# Patient Record
Sex: MALE | Race: WHITE | Employment: UNEMPLOYED | URBAN - METROPOLITAN AREA
[De-identification: names, ages, dates, MRNs, and addresses within clinical notes are randomized per-mention and may not be internally consistent; named-entity substitution may affect disease eponyms.]

---

## 2021-06-29 ENCOUNTER — TELEPHONE (OUTPATIENT)
Dept: ORTHOPEDIC SURGERY | Age: 26
End: 2021-06-29

## 2021-06-29 NOTE — TELEPHONE ENCOUNTER
Appointment Request     Patient requesting earlier appointment: Yes  Appointment offered to patient: N/A  Patient Contact Number: [phone]      Patient has been referred to us from [facility]. Patient states he has a fracture of 4th metacarpal bone of right hand. Patient says its the second break last break was in January of last year. Patient has been splinted and wanted to know if he needs to be casted.

## 2021-06-30 NOTE — TELEPHONE ENCOUNTER
Reviewed with PATRICIA. It appears to be an old healed fx. Can work patient in next week or see Zaid Kapoor. Tried to call patient to schedule.  No answer, voice mail not set up

## 2024-02-12 ENCOUNTER — OFFICE VISIT (OUTPATIENT)
Dept: FAMILY MEDICINE CLINIC | Age: 29
End: 2024-02-12
Payer: COMMERCIAL

## 2024-02-12 VITALS
OXYGEN SATURATION: 98 % | HEART RATE: 84 BPM | SYSTOLIC BLOOD PRESSURE: 132 MMHG | BODY MASS INDEX: 27.9 KG/M2 | HEIGHT: 72 IN | WEIGHT: 206 LBS | RESPIRATION RATE: 16 BRPM | DIASTOLIC BLOOD PRESSURE: 85 MMHG | TEMPERATURE: 97.2 F

## 2024-02-12 DIAGNOSIS — B18.2 CHRONIC HEPATITIS C WITHOUT HEPATIC COMA (HCC): ICD-10-CM

## 2024-02-12 DIAGNOSIS — Z00.00 ANNUAL PHYSICAL EXAM: Primary | ICD-10-CM

## 2024-02-12 DIAGNOSIS — S69.91XA INJURY OF RIGHT HAND, INITIAL ENCOUNTER: ICD-10-CM

## 2024-02-12 DIAGNOSIS — Z76.89 ENCOUNTER TO ESTABLISH CARE: ICD-10-CM

## 2024-02-12 DIAGNOSIS — F90.2 ATTENTION DEFICIT HYPERACTIVITY DISORDER (ADHD), COMBINED TYPE: ICD-10-CM

## 2024-02-12 PROCEDURE — 99385 PREV VISIT NEW AGE 18-39: CPT | Performed by: STUDENT IN AN ORGANIZED HEALTH CARE EDUCATION/TRAINING PROGRAM

## 2024-02-12 RX ORDER — DEXTROAMPHETAMINE SACCHARATE, AMPHETAMINE ASPARTATE MONOHYDRATE, DEXTROAMPHETAMINE SULFATE AND AMPHETAMINE SULFATE 7.5; 7.5; 7.5; 7.5 MG/1; MG/1; MG/1; MG/1
30 CAPSULE, EXTENDED RELEASE ORAL DAILY
Qty: 30 CAPSULE | Refills: 0 | Status: SHIPPED | OUTPATIENT
Start: 2024-02-12 | End: 2024-03-13

## 2024-02-12 RX ORDER — ALBUTEROL SULFATE 90 UG/1
2 AEROSOL, METERED RESPIRATORY (INHALATION) EVERY 6 HOURS PRN
Qty: 18 G | Refills: 3 | Status: SHIPPED | OUTPATIENT
Start: 2024-02-12

## 2024-02-12 NOTE — PROGRESS NOTES
and Affect: Mood normal.         Behavior: Behavior normal.         Thought Content: Thought content normal.         Judgment: Judgment normal.         Body mass index is 27.94 kg/m².    Labs:  No results found for this or any previous visit (from the past 672 hour(s)).    Imaging:  No orders to display         ASSESSMENT & PLAN:    German Jean is a 28 y.o. year old male here for New Patient (Hep C treatment, ADHD medication, inhaler)  .The following is a summary of the plan made at this visit:    1. Annual physical exam  2. Encounter to establish care  3. Chronic hepatitis C without hepatic coma (HCC)  -     Hepatitis C RNA QNT W Genotype RFLX; Future  -     AFL - Rene Carballo MD, Gastroenterology, [facility]  4. Attention deficit hyperactivity disorder (ADHD), combined type  -     amphetamine-dextroamphetamine (ADDERALL XR) 30 MG extended release capsule; Take 1 capsule by mouth daily for 30 days. Max Daily Amount: 30 mg, Disp-30 capsule, R-0Normal  5. Injury of right hand, initial encounter  -     Alpesh Martínez MD, Hand Surgery (Hand, Wrist, Upper Extremity), [facility]      Reviewed all pertinent previous records, including lab work and imaging.    Vital signs and physical exam are reassuring.  Will restart the patient on the ADHD medicine that he was receiving from his previous PCP, 30 mg of Adderall extended release daily.  Advised the patient that he will need to call in every month for refill and that we will need to see each other every 3 months per guidelines.  Patient verbalized understanding.  Controlled substance agreement signed.  Referral placed to orthopedics for evaluation of old hand injury and possible treatment for his chronic pain.  May need further surgical intervention.  Referral placed to GI for evaluation hepatitis C.  Will obtain hepatitis C RNA quant with genotype reflex in the interim.  Patient establishing with a dentist.  Patient states that he was screened

## 2024-02-14 LAB
HCV RNA SERPL NAA+PROBE-ACNC: ABNORMAL IU/ML
HCV RNA SERPL NAA+PROBE-LOG IU: 5.26 LOG IU/ML
HCV RNA SERPL QL NAA+PROBE: DETECTED

## 2024-02-16 LAB — HCV GENTYP SERPL NAA+PROBE: NORMAL

## 2024-03-06 DIAGNOSIS — F90.2 ATTENTION DEFICIT HYPERACTIVITY DISORDER (ADHD), COMBINED TYPE: ICD-10-CM

## 2024-03-06 RX ORDER — DEXTROAMPHETAMINE SACCHARATE, AMPHETAMINE ASPARTATE MONOHYDRATE, DEXTROAMPHETAMINE SULFATE AND AMPHETAMINE SULFATE 7.5; 7.5; 7.5; 7.5 MG/1; MG/1; MG/1; MG/1
30 CAPSULE, EXTENDED RELEASE ORAL DAILY
Qty: 30 CAPSULE | Refills: 0 | Status: SHIPPED | OUTPATIENT
Start: 2024-03-11 | End: 2024-06-09

## 2024-03-06 NOTE — TELEPHONE ENCOUNTER
Medication and Quantity requested:      amphetamine-dextroamphetamine (ADDERALL XR) 30 MG extended release capsule [[id number]]     Last Visit    02/12/2024        Pharmacy and phone number updated in EPIC:  yes      Mary rose

## 2024-03-06 NOTE — TELEPHONE ENCOUNTER
Medication:   Requested Prescriptions     Pending Prescriptions Disp Refills    amphetamine-dextroamphetamine (ADDERALL XR) 30 MG extended release capsule 30 capsule 0     Sig: Take 1 capsule by mouth daily for 30 days. Max Daily Amount: 30 mg        Last Filled:  2/12/24    Patient Phone Number: [phone] (home)     Last appt: 2/12/2024   Next appt: Visit date not found    Last OARRS:        No data to display